# Patient Record
Sex: MALE | Race: WHITE | NOT HISPANIC OR LATINO | Employment: FULL TIME | URBAN - NONMETROPOLITAN AREA
[De-identification: names, ages, dates, MRNs, and addresses within clinical notes are randomized per-mention and may not be internally consistent; named-entity substitution may affect disease eponyms.]

---

## 2017-10-30 ENCOUNTER — TRANSCRIBE ORDERS (OUTPATIENT)
Dept: PHYSICAL THERAPY | Facility: CLINIC | Age: 42
End: 2017-10-30

## 2017-10-30 DIAGNOSIS — M25.511 ACUTE PAIN OF RIGHT SHOULDER: Primary | ICD-10-CM

## 2017-10-31 ENCOUNTER — TREATMENT (OUTPATIENT)
Dept: PHYSICAL THERAPY | Facility: CLINIC | Age: 42
End: 2017-10-31

## 2017-10-31 DIAGNOSIS — M25.511 ACUTE PAIN OF RIGHT SHOULDER: ICD-10-CM

## 2017-10-31 PROCEDURE — 97140 MANUAL THERAPY 1/> REGIONS: CPT | Performed by: PHYSICAL THERAPIST

## 2017-10-31 PROCEDURE — 97001 PR PHYS THERAPY EVALUATION: CPT | Performed by: PHYSICAL THERAPIST

## 2017-10-31 NOTE — PROGRESS NOTES
" Physical Therapy Initial Evaluation and Plan of Care      Patient: Brad Israel   : 1975  Diagnosis/ICD-10 Code:  No primary diagnosis found.  Referring practitioner: KIAN Pierce    Subjective Evaluation    History of Present Illness  Mechanism of injury: Patient states he injured his right shoulder at work lifting equipment on 10/27/17. States he felt in pop with pain in the anterior shoulder and into the neck.       Patient Occupation:  Pain  Current pain ratin  At worst pain ratin  Location: Left anterior shoulder, left neck, shoulder blade  Quality: radiating and sharp  Relieving factors: rest  Aggravating factors: outstretched reach, repetitive movement, movement and lifting  Progression: improved    Hand dominance: right    Patient Goals  Patient goals for therapy: decreased pain, increased strength and return to sport/leisure activities             Objective       Tenderness     Right Shoulder  Tenderness in the biceps tendon (proximal), bicipital groove and coracoid process.     Additional Tenderness Details  Biceps tenderness increased in short head compared to long head.     Active Range of Motion     Right Shoulder   External rotation 90°: 96 degreeswith pain  Internal rotation 90°: 56 degrees     Additional Active Range of Motion Details  \"Catching\" and \"grinding\" with active shoulder elevation.     Standing active right shoulder: Flexion: 118 deg with pain; Abduction 93 deg with pain  Supine active right shoulder: Flexion: 167 deg with pain    Painful arc at approximately 70 deg ER.     Strength/Myotome Testing     Right Shoulder     Planes of Motion   External rotation at 45°: 4+   Internal rotation at 45°: 4-     Left Wrist/Hand      (2nd hand position)     Trial 1: 92 lbs    Trial 2: 97 lbs    Right Wrist/Hand      (2nd hand position)     Trial 1: 79 lbs    Trial 2: 90 lbs    Trial 3: 96 lbs    Average: 88.33 lbs    Tests     Right Shoulder   Positive Speed's " "and Tristen's.     General Comments     Shoulder Comments   \"Burning\" in anterior shoulder with repetitive bilateral shoulder elevation in supine.          Assessment & Plan     Assessment  Impairments: abnormal muscle firing, abnormal or restricted ROM, activity intolerance, impaired physical strength, lacks appropriate home exercise program and pain with function  Assessment details: Patient is a 42 year old male who comes to physical therapy with R shoulder strain. Signs and symptoms are consistent with bicep tendon strain.  The patient currently has pain, decreased ROM, decreased strength, and inability to perform all essential functional activities. Pt will benefit from skilled PT services to address the above issues.     Prognosis: fair  Prognosis details:   SHORT TERM GOALS:     2 weeks  1. Pt I w/ HEP  2. Pt to demonstrate PROM of the right shoulder to WFL to improve ability to perform ADL's  3. Pt to demonstrate ability to perform 30 minutes continuous activity in the clinic without increase in pain     LONG TERM GOALS:   6 weeks  1. Pt to demonstrate AROM of the right shoulder to WNL to allow ability to perform all necessary functional activities  2. Pt to demonstrate ability to lift 5# OH with the right arm without increase in pain  3. Pt to report being able to work full duty without increase in pain in the shoulder  4. Pt to tolerate 60 minutes continuous activity in the clinic without increase in pain     Functional Limitations: carrying objects, lifting, pulling, pushing, reaching behind back, reaching overhead and unable to perform repetitive tasks  Plan  Therapy options: will be seen for skilled physical therapy services  Planned modality interventions: cryotherapy, electrical stimulation/Russian stimulation, thermotherapy (hydrocollator packs) and ultrasound  Planned therapy interventions: manual therapy, flexibility, functional ROM exercises, home exercise program, joint mobilization, body " mechanics training, postural training, soft tissue mobilization, strengthening, stretching and therapeutic activities  Treatment plan discussed with: patient  Plan details: Pt to be seen 2-3 days per week for 2-4 weeks.         Manual Therapy:    11     mins  96662;  Therapeutic Exercise:    3     mins  78852;     Neuromuscular Shila:        mins  83419;    Therapeutic Activity:          mins  37393;     Gait Training:           mins  19992;     Ultrasound:          mins  15895;    Electrical Stimulation:         mins  74031 ( );  Dry Needling          mins self-pay    Timed Treatment:   14   mins   Total Treatment:     51   mins    PT SIGNATURE: Scot Barrera, PT   DATE TREATMENT INITIATED: 10/31/2017    Initial Certification  Certification Period: 1/29/2018  I certify that the therapy services are furnished while this patient is under my care.  The services outlined above are required by this patient, and will be reviewed every 90 days.     PHYSICIAN: KIAN Pierce      DATE:     Please sign and return via fax to  .. Thank you, [facility].

## 2017-11-10 ENCOUNTER — TREATMENT (OUTPATIENT)
Dept: PHYSICAL THERAPY | Facility: CLINIC | Age: 42
End: 2017-11-10

## 2017-11-10 DIAGNOSIS — M25.511 ACUTE PAIN OF RIGHT SHOULDER: Primary | ICD-10-CM

## 2017-11-10 PROCEDURE — 97530 THERAPEUTIC ACTIVITIES: CPT | Performed by: PHYSICAL THERAPIST

## 2017-11-10 NOTE — PROGRESS NOTES
Physical Therapy Daily Progress Note      Visit # : 2    Brad Israel reports 7/10 pain today at rest.  Pt reports he is having pain that is worse today.         Objective Pt present to PT today with minimal distress noted.     AROM: Standing R shoulder flexion 80 degrees. R shoulder abd 80 degrees and pain noted.     Supine R shoulder flexion 145 degrees,      PROM:  Moderately to maximally guarded.  He is complaining of more pain today than his last visit.     MMT: testing is very limited today due to guarding and pain.  He was unable to resist most motions.     Palpation:  Pain is very diffuse today.  Moderately guarded and painful but there does not seem to be increased MM tonicity noted in the R UT and shoulder. Somewhat aphysiological response to palpation exam.       See Exercise, Manual, and Modality Logs for complete treatment.     Assessment/Plan  Pt has limited PT compliance and sessions.  He canceled once and only scheduled 2 appointments since last MD visit.  All of his measurables are significantly worse today than his first day.  He is having some aphysiological responses to PROM and palpation.  Pt's pain status has significantly changed since his first visit without any particular report of cause.       Awaiting MD orders  We may hold until further testing or assessment from physician.            Manual Therapy:         mins  00540;  Therapeutic Exercise:         mins  83488;     Neuromuscular Shila:        mins  12388;    Therapeutic Activity:     39     mins  20608;     Gait Training:        ___  mins  79386;     Ultrasound:          mins  50873;    Electrical Stimulation:         mins  35702 ( );  Dry Needling          mins self-pay    Timed Treatment:   39   mins   Total Treatment:     40   mins    Scot Barrera, PT  Physical Therapist

## 2017-11-13 ENCOUNTER — TREATMENT (OUTPATIENT)
Dept: PHYSICAL THERAPY | Facility: CLINIC | Age: 42
End: 2017-11-13

## 2017-11-13 PROCEDURE — 97110 THERAPEUTIC EXERCISES: CPT | Performed by: PHYSICAL THERAPIST

## 2017-11-13 NOTE — PROGRESS NOTES
Physical Therapy Daily Progress Note      Visit # : 3    Brad Israel reports 7/10 pain today at rest.  Pt reports that he still feels bad from being here last time.  Anterior shoulder is the primary area of pain.         Objective Pt present to PT today with sling present.     A/AROM R shoulder on [place].  Full R shoulder flexion noted (with distress).     AROM: Supine flexion 145 degrees.     PROM:  R shoulder  degrees, IR 52 degrees without catching or painful arc consistently.     IR behind back today is thumb to L1 SP.           See Exercise, Manual, and Modality Logs for complete treatment.     Assessment/Plan  Pt with improved mobility and less guarding but he is still reporting pain about the same intensity.       Progress per Plan of Care             Manual Therapy:         mins  73445;  Therapeutic Exercise:    46     mins  38489;     Neuromuscular Shila:        mins  19840;    Therapeutic Activity:          mins  86754;     Gait Training:        ___  mins  93852;     Ultrasound:          mins  03034;    Electrical Stimulation:         mins  67885 ( );  Dry Needling          mins self-pay    Timed Treatment:   46   mins   Total Treatment:     48   mins    Scot Barrera, PT  Physical Therapist

## 2017-11-15 ENCOUNTER — TREATMENT (OUTPATIENT)
Dept: PHYSICAL THERAPY | Facility: CLINIC | Age: 42
End: 2017-11-15

## 2017-11-15 PROCEDURE — 97110 THERAPEUTIC EXERCISES: CPT | Performed by: PHYSICAL THERAPIST

## 2017-11-15 PROCEDURE — 97140 MANUAL THERAPY 1/> REGIONS: CPT | Performed by: PHYSICAL THERAPIST

## 2017-11-15 NOTE — PROGRESS NOTES
Physical Therapy Daily Progress Note      Visit # : 4     Brad Israel reports 4-5/10 pain today at rest.  Pt reports his shoulder has been hurting and his elbow.  Outside of the elbow hurting a little to day.         Objective Pt present to PT today with no distress noted at rest.  Pt wearing sling into PT area today.     Supine AROM:  Flexion 120 degrees,  A/AROM is basically WNL's    PROM:  R shoulder ER 95, IR 50 degrees.     No crepitus noted with PROM.      See Exercise, Manual, and Modality Logs for complete treatment.     Assessment/Plan  Pt's PROM and A/AROM seem to be WNL's.  His AROM is still painful and limited.       Progress per Plan of Care             Manual Therapy:    8     mins  39750;  Therapeutic Exercise:    42     mins  54911;     Neuromuscular Shila:        mins  13924;    Therapeutic Activity:          mins  48987;     Gait Training:        ___  mins  52022;     Ultrasound:          mins  00047;    Electrical Stimulation:         mins  97213 ( );  Dry Needling          mins self-pay    Timed Treatment:   50   mins   Total Treatment:     51   mins    Scot Barrera, PT  Physical Therapist

## 2017-11-20 ENCOUNTER — TREATMENT (OUTPATIENT)
Dept: PHYSICAL THERAPY | Facility: CLINIC | Age: 42
End: 2017-11-20

## 2017-11-20 PROCEDURE — 97140 MANUAL THERAPY 1/> REGIONS: CPT | Performed by: PHYSICAL THERAPIST

## 2017-11-20 PROCEDURE — 97110 THERAPEUTIC EXERCISES: CPT | Performed by: PHYSICAL THERAPIST

## 2017-11-20 NOTE — PROGRESS NOTES
Physical Therapy Daily Progress Note      Visit # : 5    Brad Israel reports 7/10 pain today at rest.  Pt reports his arm is hurting like crazy.  Pt reports his arm hurts all the way down the elbow.      AM sxs 4/10 this morning.         Objective Pt present to PT today with sling present.  Pt with moderate distress noted.      R shoulder PROM is guarded but seems to be somewhat volitional.  I did not feel any significant crepitus with PROM.       See Exercise, Manual, and Modality Logs for complete treatment.     Assessment/Plan  Pt with pain still primary issue.  PROM seemed somewhat normal but guarded.       Re-assess next visit.            Manual Therapy:    9     mins  35197;  Therapeutic Exercise:    42     mins  58265;     Neuromuscular Shila:        mins  80086;    Therapeutic Activity:          mins  55670;     Gait Training:        ___  mins  09988;     Ultrasound:          mins  56710;    Electrical Stimulation:         mins  24821 ( );  Dry Needling          mins self-pay    Timed Treatment:   51   mins   Total Treatment:     66   mins    Scot Barrera, PT  Physical Therapist

## 2017-11-30 ENCOUNTER — OFFICE VISIT (OUTPATIENT)
Dept: ORTHOPEDIC SURGERY | Facility: CLINIC | Age: 42
End: 2017-11-30

## 2017-11-30 VITALS — BODY MASS INDEX: 27.09 KG/M2 | HEIGHT: 71 IN | WEIGHT: 193.5 LBS | RESPIRATION RATE: 16 BRPM

## 2017-11-30 DIAGNOSIS — IMO0002 BURSITIS/TENDONITIS, SHOULDER: Primary | ICD-10-CM

## 2017-11-30 DIAGNOSIS — S43.421A SPRAIN OF RIGHT ROTATOR CUFF CAPSULE, INITIAL ENCOUNTER: ICD-10-CM

## 2017-11-30 PROCEDURE — 99204 OFFICE O/P NEW MOD 45 MIN: CPT | Performed by: ORTHOPAEDIC SURGERY

## 2017-11-30 RX ORDER — GABAPENTIN 800 MG/1
800 TABLET ORAL 3 TIMES DAILY
COMMUNITY

## 2017-11-30 RX ORDER — HYDROCODONE BITARTRATE AND ACETAMINOPHEN 10; 325 MG/1; MG/1
1 TABLET ORAL EVERY 6 HOURS PRN
COMMUNITY

## 2017-11-30 NOTE — PROGRESS NOTES
Subjective   Patient ID: Brad Israel is a 42 y.o. male  Pain and Consult of the Right Shoulder (Patient is here today to be seen at the request of [facility] Brittany Dumont. Patient sustained injury 9/21/17 at work while lifting heavy parts off of a robot over head. Patient continued working and informed boss. 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  Gives a history of prior shoulder injuries.  Is treated for chronic pain aching Norco 10 mg tablets 4 times daily along with gabapentin for chronic low back pain issues, has only taken ibuprofen with minimal improvement of his right shoulder pain.  Evaluated at occupational medicine and sent here for consultation.      Review of Systems   Constitutional: Negative for diaphoresis, fever and unexpected weight change.   HENT: Negative for dental problem and sore throat.    Eyes: Negative for visual disturbance.   Respiratory: Negative for shortness of breath.    Cardiovascular: Negative for chest pain.   Gastrointestinal: Negative for abdominal pain, constipation, diarrhea, nausea and vomiting.   Genitourinary: Negative for difficulty urinating and frequency.   Musculoskeletal: Positive for arthralgias.   Neurological: Negative for headaches.   Hematological: Does not bruise/bleed easily.   All other systems reviewed and are negative.      Past Medical History:   Diagnosis Date   • Injury of back         History reviewed. No pertinent surgical history.    Family History   Problem Relation Age of Onset   • Diabetes Mother        Social History     Social History   •  "Marital status: Unknown     Spouse name: N/A   • Number of children: N/A   • Years of education: N/A     Occupational History   • Not on file.     Social History Main Topics   • Smoking status: Current Every Day Smoker     Packs/day: 1.00   • Smokeless tobacco: Former User   • Alcohol use Yes   • Drug use: No   • Sexual activity: Defer     Other Topics Concern   • Not on file     Social History Narrative       I have reviewed all of the above social hx, family hx, surgical hx, medications, allergies & ROS and confirm that it is accurate.    No Known Allergies    Objective   Resp 16  Ht 71\" (180.3 cm)  Wt 193 lb 8 oz (87.8 kg)  BMI 26.99 kg/m2   Physical Exam  Constitutional: He is oriented to person, place, and time. He appears well-developed and well-nourished.   HENT:   Head: Normocephalic and atraumatic.   Eyes: EOM are normal. Pupils are equal, round, and reactive to light.   Neck: Normal range of motion. Neck supple.   Cardiovascular: Normal rate.    Pulmonary/Chest: Effort normal and breath sounds normal.   Abdominal: Soft.   Neurological: He is alert and oriented to person, place, and time.   Skin: Skin is warm and dry.   Psychiatric: He has a normal mood and affect.   Nursing note and vitals reviewed.       Ortho Exam   Right shoulder with some tenderness anteriorly over the biceps tendon and proximal subscapularis but no ecchymosis minimal swelling, no tenderness acromial clavicular joint no sign of atrophy in the supraspinatus area right arm neurovascularly intact.  Active forward elevation only 40°, active abduction only 60°, extension actively only 30°, pain present in the subscapularis and anterior bicipital tendon proximally with the belly press test and subscap lift off test.    Assessment/Plan   Review of Radiographic Studies:    Radiographic images today of affected area I personally viewed and showed no sign of acute fracture or dislocation.      Andres     Brad was seen today for pain and " consult.    Diagnoses and all orders for this visit:    Bursitis/tendonitis, shoulder  -     MRI shoulder right wo contrast; Future    Other orders  -     diclofenac (VOLTAREN) 50 MG EC tablet; Take 1 tablet by mouth 3 (Three) Times a Day.     Work status form completed and provided to patient and MRI right shoulder rule out rotator cuff tear      Recommendations/Plan:   Work/Activity Status: Unable to return to work until next evaluation    Patient agreeable to call or return sooner for any concerns.             Impression:  Work-related right shoulder injury possible rotator cuff tear, right shoulder bursitis tendinitis  Plan:  Continue out of work status, ice sling, MRI to rule out rotator cuff tear

## 2017-12-15 ENCOUNTER — HOSPITAL ENCOUNTER (OUTPATIENT)
Dept: MRI IMAGING | Facility: HOSPITAL | Age: 42
Discharge: HOME | End: 2017-12-15
Attending: ORTHOPAEDIC SURGERY | Admitting: ORTHOPAEDIC SURGERY

## 2017-12-15 DIAGNOSIS — IMO0002 BURSITIS/TENDONITIS, SHOULDER: ICD-10-CM

## 2017-12-15 PROCEDURE — 73221 MRI JOINT UPR EXTREM W/O DYE: CPT

## 2017-12-28 ENCOUNTER — OFFICE VISIT (OUTPATIENT)
Dept: ORTHOPEDIC SURGERY | Facility: CLINIC | Age: 42
End: 2017-12-28

## 2017-12-28 VITALS — BODY MASS INDEX: 28.07 KG/M2 | HEIGHT: 71 IN | RESPIRATION RATE: 18 BRPM | WEIGHT: 200.5 LBS

## 2017-12-28 DIAGNOSIS — IMO0002 BURSITIS/TENDONITIS, SHOULDER: Primary | ICD-10-CM

## 2017-12-28 PROCEDURE — 99213 OFFICE O/P EST LOW 20 MIN: CPT | Performed by: ORTHOPAEDIC SURGERY

## 2017-12-28 NOTE — PROGRESS NOTES
"Subjective   Patient ID: Brad Israel is a 42 y.o. male  Follow-up of the Right Shoulder and Results (MRI @ [facility])             History of Present Illness    Right shoulder pain continues, recent MR showed subacromial bursitis no full-thickness cuff tear, patient continues with restrictions no work doing home exercise icing.  Pain is lateral anterior hurts lift and reach feels popping and pain with certain activities.    Review of Systems   Constitutional: Negative for diaphoresis, fever and unexpected weight change.   HENT: Negative for dental problem and sore throat.    Eyes: Negative for visual disturbance.   Respiratory: Negative for shortness of breath.    Cardiovascular: Negative for chest pain.   Gastrointestinal: Negative for abdominal pain, constipation, diarrhea, nausea and vomiting.   Genitourinary: Negative for difficulty urinating and frequency.   Neurological: Negative for headaches.   Hematological: Does not bruise/bleed easily.   All other systems reviewed and are negative.      Past Medical History:   Diagnosis Date   • Injury of back         No past surgical history on file.    Family History   Problem Relation Age of Onset   • Diabetes Mother        Social History     Social History   • Marital status: Unknown     Spouse name: N/A   • Number of children: N/A   • Years of education: N/A     Occupational History   • Not on file.     Social History Main Topics   • Smoking status: Current Every Day Smoker     Packs/day: 1.00   • Smokeless tobacco: Former User   • Alcohol use Yes   • Drug use: No   • Sexual activity: Defer     Other Topics Concern   • Not on file     Social History Narrative       I have reviewed all of the above social hx, family hx, surgical hx, medications, allergies & ROS and confirm that it is accurate.    No Known Allergies    Objective   Resp 18  Ht 180.3 cm (71\")  Wt 90.9 kg (200 lb 8 oz)  BMI 27.96 kg/m2   Physical Exam  Constitutional: Patient is oriented to person, place, " and time. Patient appears well-developed and well-nourished.   HENT:Head: Normocephalic and atraumatic.   Eyes: EOM are normal. Pupils are equal, round, and reactive to light.   Neck: Normal range of motion. Neck supple.   Cardiovascular: Normal rate.    Pulmonary/Chest: Effort normal and breath sounds normal.   Abdominal: Soft.   Neurological: Patient is alert and oriented to person, place, and time.   Skin: Skin is warm and dry.   Psychiatric: Patient has a normal mood and affect.   Nursing note and vitals reviewed.       Ortho Exam   Right shoulder with positive impingement sign and tenderness anterolateral acromial area slight tenderness distal clavicle at the acromial clavicular joint, minimal biceps tenderness, forward elevation 120, abduction only 110 with pain anterolateral acromial area.    Assessment/Plan   Review of Radiographic Studies:    No new imaging done today.      Andres Salinas was seen today for results and follow-up.    Diagnoses and all orders for this visit:    Bursitis/tendonitis, shoulder  -     Ambulatory Referral to Physical Therapy Evaluate and treat     Physical therapy referral given and Work status form completed and provided to patient      Recommendations/Plan:   Work/Activity Status: Unable to return to work until next evaluation    Patient agreeable to call or return sooner for any concerns.             Impression:  Work-related right shoulder injury subacromial bursitis, MR negative for full-thickness cuff tear minimal impingement, patient deferred injection  Plan:  Therapy, out of work, recheck 1 month if not improved discuss arthroscopic decompression next visit

## 2018-01-22 ENCOUNTER — TREATMENT (OUTPATIENT)
Dept: PHYSICAL THERAPY | Facility: CLINIC | Age: 43
End: 2018-01-22

## 2018-01-22 DIAGNOSIS — M25.511 ACUTE PAIN OF RIGHT SHOULDER: Primary | ICD-10-CM

## 2018-01-22 PROCEDURE — 97110 THERAPEUTIC EXERCISES: CPT | Performed by: PHYSICAL THERAPIST

## 2018-01-22 PROCEDURE — 97002 PR PHYS THERAPY RE-EVALUATION: CPT | Performed by: PHYSICAL THERAPIST

## 2018-01-22 PROCEDURE — 97035 APP MDLTY 1+ULTRASOUND EA 15: CPT | Performed by: PHYSICAL THERAPIST

## 2018-01-22 NOTE — PROGRESS NOTES
Physical Therapy Daily Progress Note      Re-EVAL.  Last visit on 11/20/18    Visit # : 6    Brad Israel reports 6/10 pain today at rest.  Pt reports he is going in this week for an injection.  Anterior shoulder up to the neck is the area of pain today.         Objective Pt present to PT today with no distress noted at rest.     Standing AROM:  R shoulder  flexion 93 degrees      abd 98 degrees     Supine AROM:  R shoulder  flexion 142 degrees      Abd 98 degrees      PROM:  R shoulder   ER  72 degrees      IR  Limited due to guarding and pain.     MMT:  Limited multi directionally due to guarding and pain.  Each trial seemed to be slightly different due to pain and guarding.      Palpation:  Pt has tenderness throughout the R shoulder.  Primary area seems to be the bicep tendon but the      See Exercise, Manual, and Modality Logs for complete treatment.     Assessment/Plan  Pt with ROM and strength limited due to pain.  Pt's guarding and pain limit testing.  Continue with initial goals.      Quick DASH score is greater today than first visit.  Today 68.18,  10/31/17 45.45      Progress per Plan of Care  Re-assess for MD.            Manual Therapy:         mins  90330;  Therapeutic Exercise:    26     mins  79392;     Neuromuscular Shila:        mins  28263;    Therapeutic Activity:          mins  93361;     Gait Training:        ___  mins  17672;     Ultrasound:     10     mins  94615;    Electrical Stimulation:         mins  68589 ( );  Dry Needling          mins self-pay    Timed Treatment:   36   mins   Total Treatment:     56   mins    Scot Barrera, PT  Physical Therapist

## 2018-01-25 ENCOUNTER — OFFICE VISIT (OUTPATIENT)
Dept: ORTHOPEDIC SURGERY | Facility: CLINIC | Age: 43
End: 2018-01-25

## 2018-01-25 ENCOUNTER — TREATMENT (OUTPATIENT)
Dept: PHYSICAL THERAPY | Facility: CLINIC | Age: 43
End: 2018-01-25

## 2018-01-25 VITALS — BODY MASS INDEX: 28.56 KG/M2 | HEIGHT: 71 IN | RESPIRATION RATE: 16 BRPM | WEIGHT: 204 LBS

## 2018-01-25 DIAGNOSIS — IMO0002 BURSITIS/TENDONITIS, SHOULDER: Primary | ICD-10-CM

## 2018-01-25 DIAGNOSIS — M25.511 ACUTE PAIN OF RIGHT SHOULDER: Primary | ICD-10-CM

## 2018-01-25 PROCEDURE — 97035 APP MDLTY 1+ULTRASOUND EA 15: CPT | Performed by: PHYSICAL THERAPIST

## 2018-01-25 PROCEDURE — 99214 OFFICE O/P EST MOD 30 MIN: CPT | Performed by: ORTHOPAEDIC SURGERY

## 2018-01-25 PROCEDURE — 97140 MANUAL THERAPY 1/> REGIONS: CPT | Performed by: PHYSICAL THERAPIST

## 2018-01-25 PROCEDURE — 97110 THERAPEUTIC EXERCISES: CPT | Performed by: PHYSICAL THERAPIST

## 2018-01-25 NOTE — PROGRESS NOTES
Physical Therapy Daily Progress Note      Visit # : 7    Brad Israel reports 6/10 pain today at rest.  Pt reports he was stiff and sore from last PT session.         Objective Pt present to PT today with no distress at rest.  Pt noted to have pain with ROM testing.     AROM: right shoulder supine flexion 139  External rotation 67      See Exercise, Manual, and Modality Logs for complete treatment.     Assessment/Plan  Pt with pain limiting activity and function.        Progress per Plan of Care             Manual Therapy:    9     mins  68991;  Therapeutic Exercise:    28     mins  49780;     Neuromuscular Shila:        mins  16523;    Therapeutic Activity:          mins  07360;     Gait Training:        ___  mins  31846;     Ultrasound:     10     mins  87417;    Electrical Stimulation:         mins  95159 ( );  Dry Needling          mins self-pay    Timed Treatment:   47   mins   Total Treatment:     53   mins    Scot Barrera, PT  Physical Therapist

## 2018-01-25 NOTE — PROGRESS NOTES
"Subjective   Patient ID: Brad Israel is a 42 y.o. male  Follow-up of the Right Shoulder             History of Present Illness    Patient still complains of anterior burning constant pain with overhead use and laying on his side mostly along the biceps area still feels very sore after therapy pain at times does radiate up the right side of his neck under his right ear but never to his elbow wrist or hand.  No weakness in the hand.    Review of Systems   Constitutional: Negative for diaphoresis, fever and unexpected weight change.   HENT: Negative for dental problem and sore throat.    Eyes: Negative for visual disturbance.   Respiratory: Negative for shortness of breath.    Cardiovascular: Negative for chest pain.   Gastrointestinal: Negative for abdominal pain, constipation, diarrhea, nausea and vomiting.   Genitourinary: Negative for difficulty urinating and frequency.   Musculoskeletal: Positive for arthralgias.   Neurological: Negative for headaches.   Hematological: Does not bruise/bleed easily.   All other systems reviewed and are negative.      Past Medical History:   Diagnosis Date   • Injury of back         History reviewed. No pertinent surgical history.    Family History   Problem Relation Age of Onset   • Diabetes Mother        Social History     Social History   • Marital status: Unknown     Spouse name: N/A   • Number of children: N/A   • Years of education: N/A     Occupational History   • Not on file.     Social History Main Topics   • Smoking status: Current Every Day Smoker     Packs/day: 1.00   • Smokeless tobacco: Former User   • Alcohol use Yes   • Drug use: No   • Sexual activity: Defer     Other Topics Concern   • Not on file     Social History Narrative       I have reviewed all of the above social hx, family hx, surgical hx, medications, allergies & ROS and confirm that it is accurate.    No Known Allergies    Objective   Resp 16  Ht 180.3 cm (70.98\")  Wt 92.5 kg (204 lb)  BMI 28.47 " kg/m2   Physical Exam  Constitutional: Patient is oriented to person, place, and time. Patient appears well-developed and well-nourished.   HENT:Head: Normocephalic and atraumatic.   Eyes: EOM are normal. Pupils are equal, round, and reactive to light.   Neck: Normal range of motion. Neck supple.   Cardiovascular: Normal rate.    Pulmonary/Chest: Effort normal and breath sounds normal.   Abdominal: Soft.   Neurological: Patient is alert and oriented to person, place, and time.   Skin: Skin is warm and dry.   Psychiatric: Patient has a normal mood and affect.   Nursing note and vitals reviewed.       Ortho Exam   Right shoulder with tenderness at the bicipital tendon region forward elevation 110 abduction 120 internal rotation limited to bring the thumb to greater trochanter cross body abduction also painful the biceps tendon region.  Arm neurovascularly intact, Spurling maneuver negative for arm pain or paresthesias.    Assessment/Plan   Review of Radiographic Studies:    No new imaging done today.      Andres Salinas was seen today for follow-up.    Diagnoses and all orders for this visit:    Bursitis/tendonitis, shoulder  -     Ambulatory Referral to Physical Therapy Evaluate and treat     Physical therapy referral given and Work status form completed and provided to patient      Recommendations/Plan:   Work/Activity Status: No lifting over 20 pounds    Patient agreeable to call or return sooner for any concerns.       Patient has a diagnosis of bicipital tendinitis subacromial bursitis resolving shoulder strain after 9/21/17 work injury which caused the problem.    I anticipate patient will be at least 2-3 months before he is able to return to his regular work duty and also reached maximum medical improvement with regard to his injuries of 9/21/17.    He is currently able to work with restrictions to include no lifting over 20 pounds.    I have also requested additional physical therapy and recommend he  continue with his anti-inflammatory medications.      Impression:  Work-related right shoulder injury of bicipital tendinitis MRI negative for rotator cuff tear  Plan:  Continue therapy, work restriction is to include no lifting over 20 pounds recheck in 1 month

## 2018-01-30 ENCOUNTER — TREATMENT (OUTPATIENT)
Dept: PHYSICAL THERAPY | Facility: CLINIC | Age: 43
End: 2018-01-30

## 2018-01-30 DIAGNOSIS — M25.511 ACUTE PAIN OF RIGHT SHOULDER: Primary | ICD-10-CM

## 2018-01-30 PROCEDURE — 97110 THERAPEUTIC EXERCISES: CPT | Performed by: PHYSICAL THERAPIST

## 2018-01-30 NOTE — PROGRESS NOTES
Physical Therapy Daily Progress Note      Visit # : 8    Brad Israel reports 6/10 pain today at rest.  Pt with pain not improving.  Pt with pain inhibiting sleep.  Pt with pain in the anterior shoulder.         Objective Pt present to PT today with no distress noted at rest.  Pain with motion.    R shoulder A/AROM R shoulder flexion 144 degrees.      Pt with distress during exercises but he did not stop doing the exercises.       See Exercise, Manual, and Modality Logs for complete treatment.     Assessment/Plan  Pt with increased exercises today without as many rest breaks.  He is still reporting high levels of pain.       Progress per Plan of Care             Manual Therapy:         mins  58125;  Therapeutic Exercise:    54     mins  74400;     Neuromuscular Shila:        mins  12848;    Therapeutic Activity:          mins  60926;     Gait Training:        ___  mins  84014;     Ultrasound:          mins  03568;    Electrical Stimulation:         mins  26931 ( );  Dry Needling          mins self-pay    Timed Treatment:  54    mins   Total Treatment:     57   mins    Scot Barrera, PT  Physical Therapist

## 2018-02-05 ENCOUNTER — TREATMENT (OUTPATIENT)
Dept: PHYSICAL THERAPY | Facility: CLINIC | Age: 43
End: 2018-02-05

## 2018-02-05 DIAGNOSIS — M25.511 ACUTE PAIN OF RIGHT SHOULDER: Primary | ICD-10-CM

## 2018-02-05 PROCEDURE — 97110 THERAPEUTIC EXERCISES: CPT | Performed by: PHYSICAL THERAPIST

## 2018-02-05 NOTE — PROGRESS NOTES
Physical Therapy Daily Progress Note      Visit # : 9    Brad Israel reports 6/10 pain today at rest after taking pain meds.  Pt reports pain was 8/10 pain this AM prior to Meds.  Shooting pain as well.         Objective Pt present to PT today with minimal guarding at rest.     Supine R shoulder flexion 157 degrees.       See Exercise, Manual, and Modality Logs for complete treatment.     Assessment/Plan  Pt with reported pain levels still very high but his ROM has improved.        Progress per Plan of Care             Manual Therapy:         mins  66195;  Therapeutic Exercise:    54     mins  65221;     Neuromuscular Shila:        mins  08342;    Therapeutic Activity:          mins  04706;     Gait Training:        ___  mins  42508;     Ultrasound:          mins  07402;    Electrical Stimulation:         mins  97057 ( );  Dry Needling          mins self-pay    Timed Treatment:   54   mins   Total Treatment:     56   mins    Scot Barrera, PT  Physical Therapist

## 2018-02-09 ENCOUNTER — TREATMENT (OUTPATIENT)
Dept: PHYSICAL THERAPY | Facility: CLINIC | Age: 43
End: 2018-02-09

## 2018-02-09 DIAGNOSIS — M25.511 ACUTE PAIN OF RIGHT SHOULDER: Primary | ICD-10-CM

## 2018-02-09 PROCEDURE — 97110 THERAPEUTIC EXERCISES: CPT | Performed by: PHYSICAL THERAPIST

## 2018-02-09 NOTE — PROGRESS NOTES
Physical Therapy Daily Progress Note      Visit # : 10    Brad Israel reports 7/10 pain today at rest.  Pt reports he still feels the shoulder grinding with ROM activity.          Objective Pt present to PT today with no distress at rest.     AROM:  Supine R shoulder flexion 137 degrees,  R shoulder ER 79 degrees, IR 45 degree.       See Exercise, Manual, and Modality Logs for complete treatment.     Assessment/Plan  Pt with R shoulder still limited and painful.  ROM is slightly less today than last visit.       Progress per Plan of Care             Manual Therapy:         mins  20357;  Therapeutic Exercise:    54     mins  07521;     Neuromuscular Shila:        mins  70294;    Therapeutic Activity:          mins  24640;     Gait Training:        ___  mins  67878;     Ultrasound:          mins  68930;    Electrical Stimulation:         mins  25308 ( );  Dry Needling          mins self-pay    Timed Treatment:   54   mins   Total Treatment:     59   mins    Scot Barrera, PT  Physical Therapist

## 2018-02-16 ENCOUNTER — TREATMENT (OUTPATIENT)
Dept: PHYSICAL THERAPY | Facility: CLINIC | Age: 43
End: 2018-02-16

## 2018-02-16 DIAGNOSIS — M25.511 ACUTE PAIN OF RIGHT SHOULDER: Primary | ICD-10-CM

## 2018-02-16 PROCEDURE — 97110 THERAPEUTIC EXERCISES: CPT | Performed by: PHYSICAL THERAPIST

## 2018-02-16 NOTE — PROGRESS NOTES
Physical Therapy Daily Progress Note      Visit # : 11    Brad Israel reports 6/10 pain today at rest.  Pt reports about the same amount of pain today as always.         Objective Pt present to PT today with no distress noted at rest.     A/AROM:  R shoulder flexion 144 degrees.     A/AROM ball roll ups 170 degrees observed.       See Exercise, Manual, and Modality Logs for complete treatment.     Assessment/Plan  Pt with R shoulder pain and function is reportedly unchanged.  He is still having pian and guarding with exercises.       Progress per Plan of Care             Manual Therapy:         mins  65790;  Therapeutic Exercise:    53     mins  08237;     Neuromuscular Shila:        mins  80497;    Therapeutic Activity:          mins  48147;     Gait Training:        ___  mins  95904;     Ultrasound:          mins  49151;    Electrical Stimulation:         mins  10379 ( );  Dry Needling          mins self-pay    Timed Treatment:   53   mins   Total Treatment:     58   mins    Scot Barrera, PT  Physical Therapist

## 2018-02-26 ENCOUNTER — OFFICE VISIT (OUTPATIENT)
Dept: ORTHOPEDIC SURGERY | Facility: CLINIC | Age: 43
End: 2018-02-26

## 2018-02-26 VITALS — HEIGHT: 71 IN | BODY MASS INDEX: 27.85 KG/M2 | RESPIRATION RATE: 16 BRPM | WEIGHT: 198.9 LBS

## 2018-02-26 DIAGNOSIS — IMO0002 BURSITIS/TENDONITIS, SHOULDER: Primary | ICD-10-CM

## 2018-02-26 PROCEDURE — 20610 DRAIN/INJ JOINT/BURSA W/O US: CPT | Performed by: ORTHOPAEDIC SURGERY

## 2018-02-26 PROCEDURE — 99214 OFFICE O/P EST MOD 30 MIN: CPT | Performed by: ORTHOPAEDIC SURGERY

## 2018-02-26 RX ORDER — LIDOCAINE HYDROCHLORIDE 10 MG/ML
2 INJECTION, SOLUTION INFILTRATION; PERINEURAL
Status: COMPLETED | OUTPATIENT
Start: 2018-02-26 | End: 2018-02-26

## 2018-02-26 RX ORDER — TRIAMCINOLONE ACETONIDE 40 MG/ML
40 INJECTION, SUSPENSION INTRA-ARTICULAR; INTRAMUSCULAR
Status: COMPLETED | OUTPATIENT
Start: 2018-02-26 | End: 2018-02-26

## 2018-02-26 RX ADMIN — LIDOCAINE HYDROCHLORIDE 2 ML: 10 INJECTION, SOLUTION INFILTRATION; PERINEURAL at 13:27

## 2018-02-26 RX ADMIN — TRIAMCINOLONE ACETONIDE 40 MG: 40 INJECTION, SUSPENSION INTRA-ARTICULAR; INTRAMUSCULAR at 13:27

## 2018-02-26 NOTE — PROGRESS NOTES
Subjective   Patient ID: Brad Israel is a 42 y.o. 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  Pain occurs anteriorly is constant burning somewhat relieved with icing and his physical therapy.  Doesn't radiate to the elbow, no associated neck pain, no falls or injuries to the shoulder.  MR study did not show any significant impingement is some mild subacromial bursitis no tear.    Review of Systems   Constitutional: Negative for diaphoresis, fever and unexpected weight change.   HENT: Negative for dental problem and sore throat.    Eyes: Negative for visual disturbance.   Respiratory: Negative for shortness of breath.    Cardiovascular: Negative for chest pain.   Gastrointestinal: Negative for abdominal pain, constipation, diarrhea, nausea and vomiting.   Genitourinary: Negative for difficulty urinating and frequency.   Musculoskeletal: Positive for arthralgias.   Neurological: Negative for headaches.   Hematological: Does not bruise/bleed easily.   All other systems reviewed and are negative.      Past Medical History:   Diagnosis Date   • Injury of back         History reviewed. No pertinent surgical history.    Family History   Problem Relation Age of Onset   • Diabetes Mother        Social History     Social History   • Marital status: Unknown     Spouse name: N/A   • Number of children: N/A   • Years of education: N/A     Occupational History   • Not on file.     Social History Main Topics   • Smoking status: Current Every Day Smoker     Packs/day: 1.00   • Smokeless tobacco: Former User   • Alcohol use Yes   • Drug use: No   • Sexual activity: Defer     Other Topics Concern   • Not on file     Social History Narrative       I have reviewed all of the above  "social hx, family hx, surgical hx, medications, allergies & ROS and confirm that it is accurate.    No Known Allergies    Objective   Resp 16  Ht 180.3 cm (70.98\")  Wt 90.2 kg (198 lb 14.4 oz)  BMI 27.75 kg/m2   Physical Exam  Constitutional: Patient is oriented to person, place, and time. Patient appears well-developed and well-nourished.   HENT:Head: Normocephalic and atraumatic.   Eyes: EOM are normal. Pupils are equal, round, and reactive to light.   Neck: Normal range of motion. Neck supple.   Cardiovascular: Normal rate.    Pulmonary/Chest: Effort normal and breath sounds normal.   Abdominal: Soft.   Neurological: Patient is alert and oriented to person, place, and time.   Skin: Skin is warm and dry.   Psychiatric: Patient has a normal mood and affect.   Nursing note and vitals reviewed.       Ortho Exam   Right shoulder x-rays full range of motion very slight tenderness over the biceps tendon anteriorly very minimal signs of impingement no weakness or instability Spurling maneuver negative for arm pain paresthesias    Assessment/Plan   Review of Radiographic Studies:    No new imaging done today.      Large Joint Arthrocentesis  Date/Time: 2/26/2018 1:27 PM  Consent given by: patient  Site marked: site marked  Timeout: Immediately prior to procedure a time out was called to verify the correct patient, procedure, equipment, support staff and site/side marked as required   Supporting Documentation  Indications: pain   Procedure Details  Location: shoulder - R subacromial bursa  Preparation: Patient was prepped and draped in the usual sterile fashion  Needle size: 22 G  Medications administered: 40 mg triamcinolone acetonide 40 MG/ML; 2 mL lidocaine 1 %  Patient tolerance: patient tolerated the procedure well with no immediate complications           Brad was seen today for follow-up.    Diagnoses and all orders for this visit:    Bursitis/tendonitis, shoulder     Physical therapy referral given and Work " status form completed and provided to patient      Recommendations/Plan:   Work/Activity Status: Unable to return to work until next evaluation    Patient agreeable to call or return sooner for any concerns.             Impression:  Continued right shoulder pain work-related subacromial bursitis MR negative for tear or significant acromial osteophyte, minimal acromial clavicular joint arthrosis  Plan:  Continue light duty status, continue therapy, recheck 2-3 weeks, discuss arthroscopic treatment if failure to improve with injection and therapy next visit  He will require a 6 month period of time after the date of injury to reach MMI.

## 2018-03-13 ENCOUNTER — TELEPHONE (OUTPATIENT)
Dept: ORTHOPEDIC SURGERY | Facility: CLINIC | Age: 43
End: 2018-03-13

## 2018-03-16 ENCOUNTER — TELEPHONE (OUTPATIENT)
Dept: ORTHOPEDIC SURGERY | Facility: CLINIC | Age: 43
End: 2018-03-16

## 2018-03-19 ENCOUNTER — OFFICE VISIT (OUTPATIENT)
Dept: ORTHOPEDIC SURGERY | Facility: CLINIC | Age: 43
End: 2018-03-19

## 2018-03-19 VITALS — HEIGHT: 71 IN | BODY MASS INDEX: 27.72 KG/M2 | RESPIRATION RATE: 18 BRPM | WEIGHT: 198 LBS

## 2018-03-19 DIAGNOSIS — IMO0002 BURSITIS/TENDONITIS, SHOULDER: ICD-10-CM

## 2018-03-19 DIAGNOSIS — M54.12 CERVICAL RADICULOPATHY AT C6: Primary | ICD-10-CM

## 2018-03-19 PROCEDURE — 99213 OFFICE O/P EST LOW 20 MIN: CPT | Performed by: ORTHOPAEDIC SURGERY

## 2018-03-19 NOTE — PROGRESS NOTES
Subjective   Patient ID: Brad Israel is a 42 y.o. male  Follow-up and Pain of the Right Shoulder (Patient is here for follow- up on right shoulder and states the injection on 02-26-18 only lasted for 1 day and he needs a new work note.)             History of Present Illness  Continued burning pain radiating from the neck to anterior shoulder down the arm especially when he turns his head he feels a pop and snap in the neck that radiates pain of the right shoulder.  Still pain with use of the right arm especially when lifting the arm overhead or reaching out to the side.  Has pain at night laying on his right side.  Therapy seems to aggravate the shoulder pain at times.      Review of Systems   Constitutional: Negative for fever.   HENT: Negative for voice change.    Eyes: Negative for visual disturbance.   Respiratory: Negative for shortness of breath.    Cardiovascular: Negative for chest pain.   Gastrointestinal: Negative for abdominal distention and abdominal pain.   Genitourinary: Negative for dysuria.   Musculoskeletal: Positive for arthralgias. Negative for gait problem and joint swelling.   Skin: Negative for rash.   Neurological: Negative for speech difficulty.   Hematological: Does not bruise/bleed easily.   Psychiatric/Behavioral: Negative for confusion.       Past Medical History:   Diagnosis Date   • Injury of back         History reviewed. No pertinent surgical history.    Family History   Problem Relation Age of Onset   • Diabetes Mother        Social History     Social History   • Marital status: Unknown     Spouse name: N/A   • Number of children: N/A   • Years of education: N/A     Occupational History   • Not on file.     Social History Main Topics   • Smoking status: Current Every Day Smoker     Packs/day: 1.00   • Smokeless tobacco: Former User   • Alcohol use Yes   • Drug use: No   • Sexual activity: Defer     Other Topics Concern   • Not on file     Social History Narrative   • No narrative  "on file       I have reviewed all of the above social hx, family hx, surgical hx, medications, allergies & ROS and confirm that it is accurate.    No Known Allergies    Objective   Resp 18   Ht 180.3 cm (70.98\")   Wt 89.8 kg (198 lb)   BMI 27.63 kg/m²    Physical Exam  Constitutional: Patient is oriented to person, place, and time. Patient appears well-developed and well-nourished.   HENT:Head: Normocephalic and atraumatic.   Eyes: EOM are normal. Pupils are equal, round, and reactive to light.   Neck: Normal range of motion. Neck supple.   Cardiovascular: Normal rate.    Pulmonary/Chest: Effort normal and breath sounds normal.   Abdominal: Soft.   Neurological: Patient is alert and oriented to person, place, and time.   Skin: Skin is warm and dry.   Psychiatric: Patient has a normal mood and affect.   Nursing note and vitals reviewed.       Ortho Exam   Right shoulder some tenderness anteriorly biceps tendon, forward active elevation limited to 80°, abduction limited to 70° with pain anterolaterally at the right shoulder, Spurling maneuver reproduces right posterior shoulder pain and right anterior shoulder pain.  Negative inferior sulcus sign negative Alpine test, impingement sign mildly positive for anterior shoulder pain, radicular distribution of pain down the right arm posteriorly behind the elbow to the forearm with cervical range of motion.    Assessment/Plan   Review of Radiographic Studies:    No new imaging done today.      Andres Salinas was seen today for follow-up and pain.    Diagnoses and all orders for this visit:    Cervical radiculopathy at C6  -     MRI Cervical Spine Without Contrast; Future  -     Ambulatory Referral to Physical Therapy Evaluate and treat    Bursitis/tendonitis, shoulder  -     Ambulatory Referral to Physical Therapy Evaluate and treat       Physical therapy referral given      Recommendations/Plan:   Work/Activity Status: No repetitive overhead use right arm no " lifting over 20 pounds right arm until next visit and these restrictions were in place from 2/ 26    Patient agreeable to call or return sooner for any concerns.             Impression:  Right anterior shoulder pain shoulder strain, right posterior neck pain cervical radiculopathy both work-related nature  Plan:  MR cervical spine, renew physical therapy order, continue work restrictions, reevaluate after MR cervical spine complete, MMI will be determined in the future

## 2018-04-09 ENCOUNTER — TREATMENT (OUTPATIENT)
Dept: PHYSICAL THERAPY | Facility: CLINIC | Age: 43
End: 2018-04-09

## 2018-04-09 DIAGNOSIS — M25.511 ACUTE PAIN OF RIGHT SHOULDER: Primary | ICD-10-CM

## 2018-04-09 PROCEDURE — 97002 PR PHYS THERAPY RE-EVALUATION: CPT | Performed by: PHYSICAL THERAPIST

## 2018-04-09 PROCEDURE — 97530 THERAPEUTIC ACTIVITIES: CPT | Performed by: PHYSICAL THERAPIST

## 2018-04-09 NOTE — PROGRESS NOTES
Physical Therapy Daily Progress Note    Re -eval     Visit # : 12    Brad Israel reports 7/10 pain today at rest.  Pt reports she went to the MD today and they going to send him for another MRI.  Pt reports he had pain with the testing of the shoulder.  Pt reports he went to work 2 days this week and then was fired.         Objective Pt present to PT today with no distress at rest.     Standing AROM:  L     Flexion 140 degrees    R   Flexion 98 degrees  Pt is having significant scapular protraction with this testing motion.          L Abd 112 degrees.        R     Abd     80 degrees    Supine Flexion R shoulder 140 degrees.    SL shoulder abd to 90 degrees without any catching or popping noted.        PROM:  R shoulder ER 92 degrees,  IR 42 degrees.       R shoulder Flexion 180 degrees once pt relaxed and pushed through volitional resistance.      Pt with weird inconsistent catching that seems like volitional MM guarding with scapular protraction.     MMT:  R shoulder ER 3/5,  IR 2-/5          See Exercise, Manual, and Modality Logs for complete treatment.     Assessment/Plan  Pt with pain limiting testing.  His overall status has not changed much.  He is still self limiting his ROM and testing.  He is noted to be protracting his scapula during testing and wincing in pain with ROM.  I don't know why his pain levels are so high and his pain is so sharp.  I do not feel any joint popping that would indicate labral involvement but his mechanics are off.  After discussing our options the patient and the therapist decided to hold PT at this time until further testing.        Hold PT at this time until further testing or change in status.            Manual Therapy:         mins  26916;  Therapeutic Exercise:         mins  33738;     Neuromuscular Shila:        mins  53836;    Therapeutic Activity:     14     mins  68212;     Gait Training:        ___  mins  68753;     Ultrasound:          mins  89144;    Electrical  Stimulation:         mins  32343 ( );  Dry Needling          mins self-pay    Timed Treatment:   14   mins   Total Treatment:     40   mins    Scot Barrera, PT  Physical Therapist

## 2018-04-10 ENCOUNTER — TELEPHONE (OUTPATIENT)
Dept: ORTHOPEDIC SURGERY | Facility: CLINIC | Age: 43
End: 2018-04-10

## 2018-04-30 ENCOUNTER — OFFICE VISIT (OUTPATIENT)
Dept: ORTHOPEDIC SURGERY | Facility: CLINIC | Age: 43
End: 2018-04-30

## 2018-04-30 VITALS — HEIGHT: 71 IN | WEIGHT: 198 LBS | RESPIRATION RATE: 18 BRPM | BODY MASS INDEX: 27.72 KG/M2

## 2018-04-30 DIAGNOSIS — S43.421D SPRAIN OF RIGHT ROTATOR CUFF CAPSULE, SUBSEQUENT ENCOUNTER: ICD-10-CM

## 2018-04-30 DIAGNOSIS — IMO0002 BURSITIS/TENDONITIS, SHOULDER: Primary | ICD-10-CM

## 2018-04-30 DIAGNOSIS — G90.511 COMPLEX REGIONAL PAIN SYNDROME TYPE 1 OF RIGHT UPPER EXTREMITY: ICD-10-CM

## 2018-04-30 PROCEDURE — 99214 OFFICE O/P EST MOD 30 MIN: CPT | Performed by: ORTHOPAEDIC SURGERY

## 2018-04-30 NOTE — PROGRESS NOTES
Subjective   Patient ID: Brad Israel is a 42 y.o. male  Follow-up and Pain of the Right Shoulder (Patient states he is still having a lot of pain in his shoulder he also stated he lost job. Patient states he is still in therapy but the therapist wants him to have another shoulder MRI w/contrast. )             History of Present Illness    He continues to have poorly localized pain in the right shoulder he describes a catching sensation but at times is able to move the shoulder fully, his PCP has ordered a follow-up MR with contrast.  His work comp carrier has declined further coverage for treatment evaluation and he had an independent examiner recently release him to full duty work with no degree of permanent impairment.    Review of Systems   Constitutional: Negative for fever.   HENT: Negative for voice change.    Eyes: Negative for visual disturbance.   Respiratory: Negative for shortness of breath.    Cardiovascular: Negative for chest pain.   Gastrointestinal: Negative for abdominal distention and abdominal pain.   Genitourinary: Negative for dysuria.   Musculoskeletal: Positive for arthralgias. Negative for gait problem and joint swelling.   Skin: Negative for rash.   Neurological: Negative for speech difficulty.   Hematological: Does not bruise/bleed easily.   Psychiatric/Behavioral: Negative for confusion.       Past Medical History:   Diagnosis Date   • Injury of back         History reviewed. No pertinent surgical history.    Family History   Problem Relation Age of Onset   • Diabetes Mother        Social History     Social History   • Marital status: Unknown     Spouse name: N/A   • Number of children: N/A   • Years of education: N/A     Occupational History   • Not on file.     Social History Main Topics   • Smoking status: Current Every Day Smoker     Packs/day: 1.00   • Smokeless tobacco: Former User   • Alcohol use Yes   • Drug use: No   • Sexual activity: Defer     Other Topics Concern   • Not on  "file     Social History Narrative   • No narrative on file       I have reviewed all of the above social hx, family hx, surgical hx, medications, allergies & ROS and confirm that it is accurate.    No Known Allergies      Current Outpatient Prescriptions:   •  diclofenac (VOLTAREN) 50 MG EC tablet, Take 1 tablet by mouth 3 (Three) Times a Day., Disp: 90 tablet, Rfl: 1  •  gabapentin (NEURONTIN) 800 MG tablet, Take 800 mg by mouth 3 (Three) Times a Day., Disp: , Rfl:   •  HYDROcodone-acetaminophen (NORCO)  MG per tablet, Take 1 tablet by mouth Every 6 (Six) Hours As Needed for Moderate Pain ., Disp: , Rfl:     Objective   Resp 18   Ht 180.3 cm (70.98\")   Wt 89.8 kg (198 lb)   BMI 27.63 kg/m²    Physical Exam  Constitutional: Patient is oriented to person, place, and time. Patient appears well-developed and well-nourished.   HENT:Head: Normocephalic and atraumatic.   Eyes: EOM are normal. Pupils are equal, round, and reactive to light.   Neck: Normal range of motion. Neck supple.   Cardiovascular: Normal rate.    Pulmonary/Chest: Effort normal and breath sounds normal.   Abdominal: Soft.   Neurological: Patient is alert and oriented to person, place, and time.   Skin: Skin is warm and dry.   Psychiatric: Patient has a normal mood and affect.   Nursing note and vitals reviewed.       Ortho Exam   Poorly localized right diffuse shoulder pain anteriorly posteriorly laterally, no provocative maneuvers for impingement weakness or instability, no tenderness acromial clavicular joint or proximal biceps tendon, cervical spine limited motion painful right-sided neck pain with rotation extension to the right side, poorly localized right upper arm pain to physical examination    Assessment/Plan   Review of Radiographic Studies:    No new imaging done today.      Procedures     Brad was seen today for follow-up and pain.    Diagnoses and all orders for this visit:    Bursitis/tendonitis, shoulder    Sprain of right " rotator cuff capsule, subsequent encounter    Complex regional pain syndrome type 1 of right upper extremity       Refer to pain management for further evaluation and treatment complex regional pain syndrome      Recommendations/Plan:   Work/Activity Status: No lifting over 20 pounds, no overhead lifting reaching until further evaluation with chronic pain syndrome    Patient agreeable to call or return sooner for any concerns.         Nonsurgical treatment right shoulder pain advised    Impression:  Poorly defined right upper shoulder pain after work-related shoulder injury, complex regional pain syndrome  Plan:  Refer to pain management for further evaluation and treatment, restrictions at work to include no overhead lifting reaching no lifting over 20 pounds until further evaluation treatment by pain management specialist.  The patient's complex regional pain syndrome and right shoulder pain current diagnosis and treatment recommendations are all a consequence of a work-related injury 9/21/17.

## 2018-05-02 ENCOUNTER — TELEPHONE (OUTPATIENT)
Dept: ORTHOPEDIC SURGERY | Facility: CLINIC | Age: 43
End: 2018-05-02

## 2018-05-09 NOTE — TELEPHONE ENCOUNTER
I've been trying to notify the attorneys office to see what they want to do and I can't get the  to call me back